# Patient Record
Sex: MALE | Race: WHITE | NOT HISPANIC OR LATINO | Employment: OTHER | URBAN - METROPOLITAN AREA
[De-identification: names, ages, dates, MRNs, and addresses within clinical notes are randomized per-mention and may not be internally consistent; named-entity substitution may affect disease eponyms.]

---

## 2023-07-01 ENCOUNTER — HOSPITAL ENCOUNTER (EMERGENCY)
Facility: MEDICAL CENTER | Age: 33
End: 2023-07-01
Attending: EMERGENCY MEDICINE
Payer: OTHER GOVERNMENT

## 2023-07-01 VITALS
TEMPERATURE: 98.4 F | DIASTOLIC BLOOD PRESSURE: 74 MMHG | HEART RATE: 92 BPM | WEIGHT: 204.37 LBS | SYSTOLIC BLOOD PRESSURE: 132 MMHG | OXYGEN SATURATION: 95 % | BODY MASS INDEX: 27.09 KG/M2 | HEIGHT: 73 IN | RESPIRATION RATE: 19 BRPM

## 2023-07-01 DIAGNOSIS — H00.011 HORDEOLUM EXTERNUM OF RIGHT UPPER EYELID: ICD-10-CM

## 2023-07-01 PROCEDURE — 99284 EMERGENCY DEPT VISIT MOD MDM: CPT

## 2023-07-01 RX ORDER — ERYTHROMYCIN 5 MG/G
1 OINTMENT OPHTHALMIC 3 TIMES DAILY
Qty: 1 G | Refills: 0 | Status: ACTIVE | OUTPATIENT
Start: 2023-07-01 | End: 2023-07-08

## 2023-07-01 RX ORDER — CEPHALEXIN 500 MG/1
500 CAPSULE ORAL 4 TIMES DAILY
Qty: 28 CAPSULE | Refills: 0 | Status: SHIPPED | OUTPATIENT
Start: 2023-07-01 | End: 2023-07-01 | Stop reason: SDUPTHER

## 2023-07-01 RX ORDER — CEPHALEXIN 500 MG/1
500 CAPSULE ORAL 4 TIMES DAILY
Qty: 28 CAPSULE | Refills: 0 | Status: ACTIVE | OUTPATIENT
Start: 2023-07-01 | End: 2023-07-08

## 2023-07-01 RX ORDER — ERYTHROMYCIN 5 MG/G
1 OINTMENT OPHTHALMIC 3 TIMES DAILY
Qty: 1 G | Refills: 0 | Status: SHIPPED | OUTPATIENT
Start: 2023-07-01 | End: 2023-07-01 | Stop reason: SDUPTHER

## 2023-07-01 ASSESSMENT — PAIN DESCRIPTION - DESCRIPTORS: DESCRIPTORS: THROBBING

## 2023-07-02 NOTE — ED TRIAGE NOTES
"Chief Complaint   Patient presents with    Eye Pain     PT presents d/t right upper eyelid pain and swelling x 28 hours      BP (!) 147/103   Pulse 88   Temp 36.9 °C (98.4 °F) (Temporal)   Resp 18   Ht 1.854 m (6' 1\")   Wt 92.7 kg (204 lb 5.9 oz)   SpO2 97%   BMI 26.96 kg/m²     "

## 2023-07-02 NOTE — ED NOTES
Pt. Verbalizes understanding of discharge instructions. Accompanied to lobby with spouse. Pt. Alert/awake in NAD.   Prescription was sent to [facility] [address]  Advised to ff-up with PCP or the ophthalmologist.

## 2023-07-02 NOTE — ED PROVIDER NOTES
"ED Provider Note    CHIEF COMPLAINT  Chief Complaint   Patient presents with    Eye Pain     PT presents d/t right upper eyelid pain and swelling x 28 hours          HPI/ROS    Brad Norris is a 33 y.o. male who presents to the emergency department complaining of redness swelling and discomfort of the right upper eyelid.  Symptoms started about a day ago, there is no recognized precipitating event.  The patient is starting to have a little bit of burning of the right eye when he opens and closes his lid.  There is been no trauma or foreign body exposure or changes in visual acuity.  The patient lives in [place] and is visiting our area for the weekend    PAST MEDICAL HISTORY       SURGICAL HISTORY  patient denies any surgical history    FAMILY HISTORY  No family history on file.    SOCIAL HISTORY  Social History     Tobacco Use    Smoking status: Not on file    Smokeless tobacco: Not on file   Substance and Sexual Activity    Alcohol use: Not on file    Drug use: Not on file    Sexual activity: Not on file       CURRENT MEDICATIONS  Home Medications       Reviewed by Bossman Adams R.N. (Registered Nurse) on 07/01/23 at 1948  Med List Status: Not Addressed     Medication Last Dose Status        Patient [place] Taking any Medications                           ALLERGIES  No Known Allergies    PHYSICAL EXAM  VITAL SIGNS: BP (!) 147/103   Pulse 88   Temp 36.9 °C (98.4 °F) (Temporal)   Resp 18   Ht 1.854 m (6' 1\")   Wt 92.7 kg (204 lb 5.9 oz)   SpO2 97%   BMI 26.96 kg/m²    Constitutional: Awake verbal very pleasant individual he does not appear toxic or distressed  Eyes: The eye itself on the right side looks normal I do not see any erythema or discharge the pupil is round and reactive extraocular motion present anterior chamber clear no pus or discharge.  The right upper lid is lightly erythematous and swollen with what looks like central swelling consistent with a stye.  I did look underneath the " upper lid I did not find any foreign bodies.      COURSE & MEDICAL DECISION MAKING    In the emergency department I have reviewed the diagnosis with the patient and his family and I have discussed treatment strategies ranging from simple warm compresses to topical and oral antibiotics.  After discussion I did send prescriptions to the pharmacy for 7-day course of erythromycin ointment and oral Keflex.  I have also encouraged warm compresses multiple times daily until this has improved.  I have advised the patient to return here if he has worsening symptoms while in [place] and if this is not clearly improved over the next week he is to follow-up with an ophthalmologist in his community for recheck    FINAL DIAGNOSIS  1. Hordeolum externum of right upper eyelid           Electronically signed by: Darrell Desai M.D., 7/1/2023 8:09 PM

## 2023-07-02 NOTE — DISCHARGE INSTRUCTIONS
If this is not clearly improved over the next week see an ophthalmologist in your community for recheck.  Return here if you have new or worsening symptoms while in [place].  Use warm moist compresses several times daily until this starts to improve.